# Patient Record
Sex: MALE | Race: BLACK OR AFRICAN AMERICAN
[De-identification: names, ages, dates, MRNs, and addresses within clinical notes are randomized per-mention and may not be internally consistent; named-entity substitution may affect disease eponyms.]

---

## 2018-08-20 ENCOUNTER — HOSPITAL ENCOUNTER (EMERGENCY)
Dept: HOSPITAL 68 - ERH | Age: 23
End: 2018-08-20
Payer: SELF-PAY

## 2018-08-20 VITALS — DIASTOLIC BLOOD PRESSURE: 60 MMHG | SYSTOLIC BLOOD PRESSURE: 112 MMHG

## 2018-08-20 DIAGNOSIS — R11.2: Primary | ICD-10-CM

## 2018-08-20 LAB
ABSOLUTE GRANULOCYTE CT: 4.8 /CUMM (ref 1.4–6.5)
BASOPHILS # BLD: 0 /CUMM (ref 0–0.2)
BASOPHILS NFR BLD: 0.3 % (ref 0–2)
EOSINOPHIL # BLD: 0 /CUMM (ref 0–0.7)
EOSINOPHIL NFR BLD: 0 % (ref 0–5)
ERYTHROCYTE [DISTWIDTH] IN BLOOD BY AUTOMATED COUNT: 13 % (ref 11.5–14.5)
GRANULOCYTES NFR BLD: 75.2 % (ref 42.2–75.2)
HCT VFR BLD CALC: 43.2 % (ref 42–52)
LYMPHOCYTES # BLD: 1.1 /CUMM (ref 1.2–3.4)
MCH RBC QN AUTO: 29.8 PG (ref 27–31)
MCHC RBC AUTO-ENTMCNC: 32.8 G/DL (ref 33–37)
MCV RBC AUTO: 90.9 FL (ref 80–94)
MONOCYTES # BLD: 0.4 /CUMM (ref 0.1–0.6)
PLATELET # BLD: 187 /CUMM (ref 130–400)
PMV BLD AUTO: 9.4 FL (ref 7.4–10.4)
RED BLOOD CELL CT: 4.75 /CUMM (ref 4.7–6.1)
WBC # BLD AUTO: 6.3 /CUMM (ref 4.8–10.8)

## 2018-08-20 NOTE — ED GENERAL ADULT
History of Present Illness
 
General
Chief Complaint: Nausea, Vomiting, Diarrhea
Stated Complaint: +NV-D, YUNIORKEY, X3HRS
Source: patient
Exam Limitations: no limitations
 
Vital Signs & Intake/Output
Vital Signs & Intake/Output
 Vital Signs
 
 
Date Time Temp Pulse Resp B/P B/P Pulse O2 O2 Flow FiO2
 
     Mean Ox Delivery Rate 
 
08/20 2331 98.9 74 16 112/60  98 Room Air  
 
08/20 1811 98.3 64 18 110/69  98 Room Air  
 
 
 ED Intake and Output
 
 
 08/21 0000 08/20 1200
 
Intake Total 1000 
 
Output Total  
 
Balance 1000 
 
   
 
Intake, IV 1000 
 
 
 
Allergies
Coded Allergies:
No Known Allergies (08/20/18)
 
Reconcile Medications
Ondansetron (Zofran Odt) 4 MG TAB.RAPDIS   1 TAB SL TID PRN nausea
 
Triage Note:
23 YO MALE TO TRIAGE FOR EVAL OF +VOMTINIG X3
HOURS. PT REPORTS HE HASNT EATEN ALL DAY D/T NOT
FEELING WELL. DENIES ANY PAIN ANYWHERE. DENIES
DIARRHEA.
Triage Nurses Notes Reviewed? yes
Onset: Abrupt
Duration: hour(s):
Timing: constant
HPI:
23 y/o otherwise healthy male presenting with suddent onset of N/V after 
drinking a juice a few hours prior to arrival. Denies associated abdominal pain 
or diarrhea. No fevers. No melena or hematochezia. No sick contacts or recent 
travel. Denies CP or SOB.
(Ariadna Gooden)
 
Past History
 
Travel History
Traveled to [place] past 21 day No
 
Medical History
Any Pertinent Medical History? none
Neurological: NONE
EENT: NONE
Cardiovascular: NONE
Respiratory: NONE
Gastrointestinal: NONE
Hepatic: NONE
Renal: NONE
Musculoskeletal: NONE
Psychiatric: NONE
Endocrine: NONE
Blood Disorders: NONE
Cancer(s): NONE
GYN/Reproductive: NONE
 
Surgical History
Surgical History: non-contributory
 
Psychosocial History
What is your primary language English
Tobacco Use: Never used
 
Family History
Hx Contributory? No
(Ariadna Gooden)
 
Review of Systems
 
Review of Systems
Constitutional:
Reports: no symptoms. 
EENTM:
Reports: no symptoms. 
Respiratory:
Reports: no symptoms. 
Cardiovascular:
Reports: no symptoms. 
GI:
Reports: see HPI. 
Genitourinary:
Reports: no symptoms. 
Musculoskeletal:
Reports: no symptoms. 
Skin:
Reports: no symptoms. 
Neurological/Psychological:
Reports: no symptoms. 
Hematologic/Endocrine:
Reports: no symptoms. 
Immunologic/Allergic:
Reports: no symptoms. 
All Other Systems: Reviewed and Negative
(Ariadna Gooden)
 
Physical Exam
 
Physical Exam
General Appearance: well developed/nourished, no apparent distress, alert, awake
, comfortable
Comments:
Gen.: Well-nourished, well-developed, no acute distress.
Head: Normocephalic, atraumatic.
Eyes: Normal inspection bilaterally
Ears: Normal inspection bilaterally
Nose: Normal inspection
Neck: Normal inspection
Lungs: clear to auscultation bilaterally, normnal breath sounds
Heart: regular rate and rhythm
Abdomen: soft and non-tender
Extremities: Normal inspection
Neurologic: alert and oriented x3, steady gait
Skin: warm and dry
Psychiatric: Normal mood and affect, no apparent delusions or hallucinations, 
behavior appropriate
 
Core Measures
ACS in differential dx? No
CVA/TIA Diagnosis: No
Sepsis Present: No
Sepsis Focused Exam Completed? No
(Ariadna Gooden)
 
Progress
Differential Diagnoses
I considered the following diagnoses in my evaluation of the patient: [gastritis
vs food poisoning vs GERD vs PUD vs pancreatitis vs biliary]
 
Plan of Care:
 Orders
 
 
Procedure Date/time Status
 
URINALYSIS 08/20 1846 Complete
 
LIPASE 08/20 1846 Complete
 
COMPREHENSIVE METABOLIC PANEL 08/20 1846 Complete
 
CBC WITHOUT DIFFERENTIAL 08/20 1846 Complete
 
 
 Laboratory Tests
 
 
 
08/20/18 1948:
Urine Color YEL, Urine Clarity CLEAR, Urine pH 6.0, Ur Specific Gravity 1.020, 
Urine Protein NEG, Urine Ketones NEG, Urine Nitrite NEG, Urine Bilirubin NEG, 
Urine Urobilinogen 1.0, Ur Leukocyte Esterase NEG, Ur Microscopic EXAM NOT 
REQUIRED, Urine Hemoglobin NEG, Urine Glucose NEG
 
08/20/18 1928:
Anion Gap 16, Estimated GFR > 60, BUN/Creatinine Ratio 7.7, Glucose 81, Calcium 
9.8, Total Bilirubin 1.0, AST 25, ALT 30, Alkaline Phosphatase 95, Total Protein
8.7  H, Albumin 5.3  H, Globulin 3.4, Albumin/Globulin Ratio 1.6, Lipase 46, CBC
w Diff NO MAN DIFF REQ, RBC 4.75, MCV 90.9, MCH 29.8, MCHC 32.8  L, RDW 13.0, 
MPV 9.4, Gran % 75.2, Lymphocytes % 17.6  L, Monocytes % 6.9, Eosinophils % 0, 
Basophils % 0.3, Absolute Granulocytes 4.8, Absolute Lymphocytes 1.1  L, 
Absolute Monocytes 0.4, Absolute Eosinophils 0, Absolute Basophils 0
 
Labs showed mild elevated creatinine of 1.3, no prior for comparison. Reports 
resolution of symptoms after NS bolus and zofran. Able to tolerate po in the 
emergency department. Likely with gastritis. Given rx zofran. Counseled on 
supportive care and strict return precautions. 
Initial ED EKG: none
(Ariadna Gooden)
 
Departure
 
Departure
Disposition: HOME OR SELF CARE
Condition: Stable
Clinical Impression
Primary Impression: Nausea and vomiting
Referrals:
Patient Has No Primary Care Dr (PCP/Family)
 
Additional Instructions:
Use zofran as needed for nausea. Maintain adequate fluid intake. Return to the 
emergency department for any new or worsening symptoms.
Departure Forms:
Customer Survey
General Discharge Information
Prescriptions:
Current Visit Scripts
Ondansetron (Zofran Odt) 1 TAB SL TID PRN nausea 
     #20 TAB 
 
 
(Ariadna Gooden)
 
PA/NP Co-Sign Statement
Statement:
ED Attending supervision documentation-
 
[] I saw and evaluated the patient. I have also reviewed all the pertinent lab 
results and diagnostic results. I agree with the findings and the plan of care 
as documented in the PA's/NP's documentation. 
 
[x] I have reviewed the ED Record and agree with the PA's/NP's documentation.
 
[] Additions or exceptions (if any) to the PAs/NP's note and plan are 
summarized below:
[]
 
(Alejandro Riley DO)
 
Critical Care Note
 
Critical Care Note
Critical Care Time: non-applicable
(Ariadna Gooden)